# Patient Record
Sex: MALE | Race: WHITE | NOT HISPANIC OR LATINO | Employment: FULL TIME | URBAN - METROPOLITAN AREA
[De-identification: names, ages, dates, MRNs, and addresses within clinical notes are randomized per-mention and may not be internally consistent; named-entity substitution may affect disease eponyms.]

---

## 2023-10-25 ENCOUNTER — HOSPITAL ENCOUNTER (EMERGENCY)
Facility: CLINIC | Age: 38
Discharge: HOME | End: 2023-10-25
Attending: EMERGENCY MEDICINE | Admitting: EMERGENCY MEDICINE
Payer: COMMERCIAL

## 2023-10-25 VITALS
HEIGHT: 68 IN | DIASTOLIC BLOOD PRESSURE: 84 MMHG | OXYGEN SATURATION: 99 % | TEMPERATURE: 98 F | BODY MASS INDEX: 25.01 KG/M2 | WEIGHT: 165 LBS | RESPIRATION RATE: 16 BRPM | SYSTOLIC BLOOD PRESSURE: 154 MMHG | HEART RATE: 88 BPM

## 2023-10-25 DIAGNOSIS — S00.451A FOREIGN BODY OF RIGHT EXTERNAL EAR: Primary | ICD-10-CM

## 2023-10-25 PROCEDURE — 99283 EMERGENCY DEPT VISIT LOW MDM: CPT

## 2023-10-25 PROCEDURE — 99283 EMERGENCY DEPT VISIT LOW MDM: CPT | Mod: 25

## 2023-10-25 PROCEDURE — 69200 CLEAR OUTER EAR CANAL: CPT | Mod: RT

## 2023-10-25 NOTE — ED PROVIDER NOTES
"    History     Chief Complaint:  Ear Injury       The history is provided by the patient.      Rush Tan is a 38 year old male who presents with a foreign body in his right ear. The patient reports that he got an earpod stuck in his right ear last night. He states that it was a [employer]frey Inez earpod. He notes he feels safe at home, denies any suicidal ideation, medications, falls, trauma, or fever.     Independent Historian:    None - patient only    Medications:    The patient is not currently taking any prescribed medications.    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 Cooperative.     In no acute distress  HEENT:  Head:  Atraumatic  Ears:  There is a foreign body present to the external ear canal of the right ear.  Left ear appears normal.  Mouth/Throat:  Oropharynx is without erythema or exudate and mucous membranes are moist   Eyes:   Conjunctivae normal and EOM are normal. No scleral icterus.  CV:  Normal rate, regular rhythm, normal heart sounds and radial pulses are 2+ and symmetric.  No murmur.  Resp:  Breath sounds are clear bilaterally    Non-labored, no retractions or accessory muscle use  MS:  Normal range of motion. No edema.  Skin:  Warm and dry.  No rash or lesions noted.  Neuro:   Alert. Normal strength.  GCS: 15  Psych: Normal mood and affect.    Emergency Department Course       [facility]    Foreign Body Removal    Date/Time: 10/25/2023 8:21 AM    Performed by: Alfredo Fontenot, " MD  Authorized by: Alfreod Fontenot MD    Emergent condition/consent implied      LOCATION     Location:  Ear    Ear location:  R ear  PRE-PROCEDURE DETAILS     Imaging:  None  ANESTHESIA (see MAR for exact dosages)     Anesthesia method:  None  PROCEDURE TYPE     Procedure complexity:  Simple    PROCEDURE DETAILS     Removal mechanism:  Forceps    Foreign bodies recovered:  1    Description:  Soft silicone ear bud    Intact foreign body removal: yes      POST-PROCEDURE DETAILS     Neurovascular status: intact      Confirmation:  No additional foreign bodies on visualization    Patient tolerance of procedure:  Patient tolerated the procedure well with no immediate complications      PROCEDURE    Patient Tolerance:  Patient tolerated the procedure well with no immediate complications     Emergency Department Course & Assessments:    Interventions:  Medications - No data to display     Assessments:  0811 I obtained history and examined the patient as noted above.    Independent Interpretation (X-rays, CTs, rhythm strip):  None    Consultations/Discussion of Management or Tests:  None    Social Determinants of Health affecting care:  Healthcare Access/Compliance     Disposition:  The patient was discharged to home.     Impression & Plan    CMS Diagnoses: None    Medical Decision Making:  Rush Tan is a 38 year old male who presents for evaluation of a foreign body in the right ear.  This was successfully removed, see above procedure note. Patient is more comfortable after removal.  Will have them follow up with primary care as needed.      Diagnosis:    ICD-10-CM    1. Foreign body of right external ear  S00.451A            Scribe Disclosure:  Sadia LIN, am serving as a scribe at 8:15 AM on 10/25/2023 to document services personally performed by Alfredo Fontenot MD, based on my observations and the provider's statements to me.  10/25/2023   Alfredo Fontenot MD White, Scott, MD  10/25/23 [id number]